# Patient Record
Sex: MALE | URBAN - NONMETROPOLITAN AREA
[De-identification: names, ages, dates, MRNs, and addresses within clinical notes are randomized per-mention and may not be internally consistent; named-entity substitution may affect disease eponyms.]

---

## 2019-10-09 ENCOUNTER — TELEPHONE (OUTPATIENT)
Dept: SURGERY | Facility: CLINIC | Age: 84
End: 2019-10-09

## 2024-08-21 NOTE — TELEPHONE ENCOUNTER
Patient wife called  Stated patient is still having  Pain from surgery and out of pain pills. Advised  For the patient to take tylenol and  Motrin. Spouse voiced undestanding   [General Appearance - Well Developed] : well developed [Normal Appearance] : normal appearance [Normal Conjunctiva] : the conjunctiva exhibited no abnormalities [Normal Oral Mucosa] : normal oral mucosa [Bradycardia] : bradycardic [Bowel Sounds] : normal bowel sounds [Nail Clubbing] : no clubbing of the fingernails [] : no rash [No Anxiety] : not feeling anxious